# Patient Record
Sex: MALE | Race: WHITE | NOT HISPANIC OR LATINO
[De-identification: names, ages, dates, MRNs, and addresses within clinical notes are randomized per-mention and may not be internally consistent; named-entity substitution may affect disease eponyms.]

---

## 2018-11-28 ENCOUNTER — APPOINTMENT (OUTPATIENT)
Dept: SURGERY | Facility: CLINIC | Age: 53
End: 2018-11-28
Payer: COMMERCIAL

## 2018-11-28 VITALS
DIASTOLIC BLOOD PRESSURE: 72 MMHG | SYSTOLIC BLOOD PRESSURE: 122 MMHG | HEART RATE: 83 BPM | HEIGHT: 69 IN | WEIGHT: 160.38 LBS | BODY MASS INDEX: 23.76 KG/M2 | TEMPERATURE: 98 F | OXYGEN SATURATION: 98 %

## 2018-11-28 DIAGNOSIS — Z87.891 PERSONAL HISTORY OF NICOTINE DEPENDENCE: ICD-10-CM

## 2018-11-28 DIAGNOSIS — Z80.6 FAMILY HISTORY OF LEUKEMIA: ICD-10-CM

## 2018-11-28 DIAGNOSIS — Z78.9 OTHER SPECIFIED HEALTH STATUS: ICD-10-CM

## 2018-11-28 PROBLEM — Z00.00 ENCOUNTER FOR PREVENTIVE HEALTH EXAMINATION: Status: ACTIVE | Noted: 2018-11-28

## 2018-11-28 PROCEDURE — 99203 OFFICE O/P NEW LOW 30 MIN: CPT

## 2018-12-11 ENCOUNTER — OUTPATIENT (OUTPATIENT)
Dept: OUTPATIENT SERVICES | Facility: HOSPITAL | Age: 53
LOS: 1 days | End: 2018-12-11

## 2018-12-11 VITALS
OXYGEN SATURATION: 99 % | WEIGHT: 158.95 LBS | DIASTOLIC BLOOD PRESSURE: 86 MMHG | SYSTOLIC BLOOD PRESSURE: 140 MMHG | RESPIRATION RATE: 117 BRPM | HEART RATE: 59 BPM | TEMPERATURE: 98 F | HEIGHT: 68.5 IN

## 2018-12-11 DIAGNOSIS — R22.2 LOCALIZED SWELLING, MASS AND LUMP, TRUNK: ICD-10-CM

## 2018-12-11 DIAGNOSIS — Z98.890 OTHER SPECIFIED POSTPROCEDURAL STATES: Chronic | ICD-10-CM

## 2018-12-11 LAB
ALBUMIN SERPL ELPH-MCNC: 4.5 G/DL — SIGNIFICANT CHANGE UP (ref 3.3–5)
ALP SERPL-CCNC: 69 U/L — SIGNIFICANT CHANGE UP (ref 40–120)
ALT FLD-CCNC: 31 U/L — SIGNIFICANT CHANGE UP (ref 4–41)
AST SERPL-CCNC: 27 U/L — SIGNIFICANT CHANGE UP (ref 4–40)
BILIRUB SERPL-MCNC: 0.5 MG/DL — SIGNIFICANT CHANGE UP (ref 0.2–1.2)
BUN SERPL-MCNC: 14 MG/DL — SIGNIFICANT CHANGE UP (ref 7–23)
CALCIUM SERPL-MCNC: 9.7 MG/DL — SIGNIFICANT CHANGE UP (ref 8.4–10.5)
CHLORIDE SERPL-SCNC: 101 MMOL/L — SIGNIFICANT CHANGE UP (ref 98–107)
CO2 SERPL-SCNC: 27 MMOL/L — SIGNIFICANT CHANGE UP (ref 22–31)
CREAT SERPL-MCNC: 0.83 MG/DL — SIGNIFICANT CHANGE UP (ref 0.5–1.3)
GLUCOSE SERPL-MCNC: 86 MG/DL — SIGNIFICANT CHANGE UP (ref 70–99)
HCT VFR BLD CALC: 44.8 % — SIGNIFICANT CHANGE UP (ref 39–50)
HGB BLD-MCNC: 14.9 G/DL — SIGNIFICANT CHANGE UP (ref 13–17)
MCHC RBC-ENTMCNC: 33 PG — SIGNIFICANT CHANGE UP (ref 27–34)
MCHC RBC-ENTMCNC: 33.3 % — SIGNIFICANT CHANGE UP (ref 32–36)
MCV RBC AUTO: 99.1 FL — SIGNIFICANT CHANGE UP (ref 80–100)
NRBC # FLD: 0 — SIGNIFICANT CHANGE UP
PLATELET # BLD AUTO: 270 K/UL — SIGNIFICANT CHANGE UP (ref 150–400)
PMV BLD: 9.9 FL — SIGNIFICANT CHANGE UP (ref 7–13)
POTASSIUM SERPL-MCNC: 3.7 MMOL/L — SIGNIFICANT CHANGE UP (ref 3.5–5.3)
POTASSIUM SERPL-SCNC: 3.7 MMOL/L — SIGNIFICANT CHANGE UP (ref 3.5–5.3)
PROT SERPL-MCNC: 7.2 G/DL — SIGNIFICANT CHANGE UP (ref 6–8.3)
RBC # BLD: 4.52 M/UL — SIGNIFICANT CHANGE UP (ref 4.2–5.8)
RBC # FLD: 12.6 % — SIGNIFICANT CHANGE UP (ref 10.3–14.5)
SODIUM SERPL-SCNC: 140 MMOL/L — SIGNIFICANT CHANGE UP (ref 135–145)
WBC # BLD: 5.41 K/UL — SIGNIFICANT CHANGE UP (ref 3.8–10.5)
WBC # FLD AUTO: 5.41 K/UL — SIGNIFICANT CHANGE UP (ref 3.8–10.5)

## 2018-12-11 NOTE — H&P PST ADULT - NEGATIVE BREAST SYMPTOMS
no breast lump R/no nipple discharge R/no breast tenderness R/no nipple discharge L/no breast tenderness L/no breast lump L

## 2018-12-11 NOTE — H&P PST ADULT - HISTORY OF PRESENT ILLNESS
52 y/o  male with no significant PMHx presents to PST for pre op evaluation with 1 yr hx of left shoulder mass. Pt now c/o intermittent left shoulder pain with pressure. Pt was referred to surgeon by PCP for further evaluation. now scheduled f or excision of left shoulder mass on 12/28/18  Pt is a poor historian 52 y/o  male with no significant PMHx presents to PST for pre op evaluation with 1 yr hx of left shoulder mass. Pt now c/o intermittent left shoulder pain with pressure. Pt was referred to surgeon by PCP for further evaluation. Now scheduled for excision of left shoulder mass on 12/28/18  Pt is a poor historian

## 2018-12-11 NOTE — H&P PST ADULT - GENERAL GENITOURINARY SYMPTOMS
c/o feeling of incomplete emptying/increased urinary frequency c/o feeling of incomplete emptying - pt being followed by urologist/increased urinary frequency

## 2018-12-11 NOTE — H&P PST ADULT - NEGATIVE OPHTHALMOLOGIC SYMPTOMS
no lacrimation R/no discharge R/no pain L/no irritation R/no pain R/no loss of vision L/no photophobia/no lacrimation L/no diplopia/no blurred vision R/no blurred vision L/no discharge L/no irritation L/no loss of vision R

## 2018-12-11 NOTE — H&P PST ADULT - NEGATIVE CARDIOVASCULAR SYMPTOMS
no palpitations/no paroxysmal nocturnal dyspnea/no dyspnea on exertion/no orthopnea/no peripheral edema/no chest pain/no claudication

## 2018-12-11 NOTE — H&P PST ADULT - RS GEN PE MLT RESP DETAILS PC
breath sounds equal/no rales/no intercostal retractions/clear to auscultation bilaterally/good air movement/no chest wall tenderness/no rhonchi/no wheezes/respirations non-labored/airway patent

## 2018-12-11 NOTE — H&P PST ADULT - NEGATIVE GASTROINTESTINAL SYMPTOMS
no nausea/no change in bowel habits/no hiccoughs/no vomiting/no diarrhea/no melena/no jaundice/no constipation/no abdominal pain

## 2018-12-11 NOTE — H&P PST ADULT - NEGATIVE GENERAL GENITOURINARY SYMPTOMS
no flank pain R/no urine discoloration/no bladder infections/no nocturia/no renal colic/no hematuria/no flank pain L

## 2018-12-11 NOTE — H&P PST ADULT - PSH
H/O left inguinal hernia repair  x4; (at age [age] and 2002)  Status post scrotal varicocelectomy  at age 15

## 2018-12-11 NOTE — H&P PST ADULT - MUSCULOSKELETAL
details… detailed exam no joint erythema/normal strength/no joint swelling/no joint warmth/no calf tenderness

## 2018-12-11 NOTE — H&P PST ADULT - PROBLEM SELECTOR PLAN 1
Scheduled for excision of left shoulder mass on 12/28/18. Pre op instructions, famotidine given and explained. Pt verbalized understanding.

## 2018-12-19 PROBLEM — R22.9 LOCALIZED SWELLING, MASS AND LUMP, UNSPECIFIED: Chronic | Status: ACTIVE | Noted: 2018-12-11

## 2018-12-27 ENCOUNTER — TRANSCRIPTION ENCOUNTER (OUTPATIENT)
Age: 53
End: 2018-12-27

## 2018-12-28 ENCOUNTER — OUTPATIENT (OUTPATIENT)
Dept: OUTPATIENT SERVICES | Facility: HOSPITAL | Age: 53
LOS: 1 days | Discharge: HOME | End: 2018-12-28
Payer: COMMERCIAL

## 2018-12-28 ENCOUNTER — RESULT REVIEW (OUTPATIENT)
Age: 53
End: 2018-12-28

## 2018-12-28 ENCOUNTER — APPOINTMENT (OUTPATIENT)
Dept: SURGERY | Facility: AMBULATORY SURGERY CENTER | Age: 53
End: 2018-12-28

## 2018-12-28 VITALS
RESPIRATION RATE: 18 BRPM | HEART RATE: 43 BPM | SYSTOLIC BLOOD PRESSURE: 136 MMHG | DIASTOLIC BLOOD PRESSURE: 79 MMHG | OXYGEN SATURATION: 99 %

## 2018-12-28 VITALS
WEIGHT: 158.95 LBS | DIASTOLIC BLOOD PRESSURE: 80 MMHG | HEART RATE: 54 BPM | TEMPERATURE: 98 F | OXYGEN SATURATION: 100 % | RESPIRATION RATE: 20 BRPM | SYSTOLIC BLOOD PRESSURE: 136 MMHG | HEIGHT: 68.5 IN

## 2018-12-28 DIAGNOSIS — R22.2 LOCALIZED SWELLING, MASS AND LUMP, TRUNK: ICD-10-CM

## 2018-12-28 DIAGNOSIS — Z98.890 OTHER SPECIFIED POSTPROCEDURAL STATES: Chronic | ICD-10-CM

## 2018-12-28 PROCEDURE — 88304 TISSUE EXAM BY PATHOLOGIST: CPT | Mod: 26

## 2018-12-28 PROCEDURE — 21933 EXC BACK TUM DEEP 5 CM/>: CPT | Mod: GC

## 2018-12-28 RX ORDER — SODIUM CHLORIDE 9 MG/ML
1000 INJECTION, SOLUTION INTRAVENOUS
Qty: 0 | Refills: 0 | Status: DISCONTINUED | OUTPATIENT
Start: 2018-12-28 | End: 2019-01-12

## 2018-12-28 NOTE — BRIEF OPERATIVE NOTE - PROCEDURE
<<-----Click on this checkbox to enter Procedure Excision of mass of left shoulder  12/28/2018  posterior  Active  ADEOLVEI

## 2018-12-28 NOTE — ASU DISCHARGE PLAN (ADULT/PEDIATRIC). - NOTIFY
Swelling that continues/Persistent Nausea and Vomiting/Fever greater than 101/Bleeding that does not stop Unable to Urinate/Persistent Nausea and Vomiting/Fever greater than 101/Inability to Tolerate Liquids or Foods/Bleeding that does not stop/Swelling that continues/Pain not relieved by Medications

## 2019-01-17 LAB — SURGICAL PATHOLOGY STUDY: SIGNIFICANT CHANGE UP

## 2020-01-14 NOTE — H&P PST ADULT - NEGATIVE SKIN SYMPTOMS
[id number] has been placed.     Varithena  Right Leg  CPT: [id number]    Thanks  
Varithena and follow up scheduled with assistance from Fabio's wife. He will be starting chemo and would like to wait till that is completed so appointments scheduled for May. All questions answered and instructional letter mailed.  
no brittle nails/no rash/no tumor/no change in size/color of mole

## 2022-01-01 NOTE — ASU PREOP CHECKLIST - PATIENT PROBLEMS/NEEDS
57 yo male from home A&OX4 c/o RLQ abd pain. Patient states pain began a few days ago after skiing, denies any trauma. Hx hernia repair 04/2021 with mesh placement, abd surg to remove chicken bone 11/2020. Patient denies n/v/d/dizziness. Patient denies blood in stool, fevers/chills. Patient denies chest pain, SOB, LOC. Patient states abd is slightly distended, RLQ tender on palpation. VS stable.
Patient expressed no known problems or needs